# Patient Record
Sex: FEMALE | Race: WHITE | URBAN - METROPOLITAN AREA
[De-identification: names, ages, dates, MRNs, and addresses within clinical notes are randomized per-mention and may not be internally consistent; named-entity substitution may affect disease eponyms.]

---

## 2017-02-09 PROBLEM — 87522002 IRON DEFICIENCY ANEMIA: Status: ACTIVE | Noted: 2017-02-09

## 2017-04-04 PROBLEM — 4556007 GASTRITIS: Status: ACTIVE | Noted: 2017-04-04

## 2017-04-04 PROBLEM — 266433003 GASTRO-ESOPHAGEAL REFLUX DISEASE WITH ESOPHAGITIS: Status: ACTIVE | Noted: 2017-04-04

## 2023-08-22 ENCOUNTER — OFFICE VISIT (OUTPATIENT)
Dept: URBAN - METROPOLITAN AREA CLINIC 35 | Facility: CLINIC | Age: 58
End: 2023-08-22

## 2023-10-02 ENCOUNTER — OFFICE VISIT (OUTPATIENT)
Dept: URBAN - METROPOLITAN AREA CLINIC 33 | Facility: CLINIC | Age: 58
End: 2023-10-02

## 2024-11-13 ENCOUNTER — DASHBOARD ENCOUNTERS (OUTPATIENT)
Age: 59
End: 2024-11-13

## 2024-11-13 ENCOUNTER — OFFICE VISIT (OUTPATIENT)
Dept: URBAN - METROPOLITAN AREA CLINIC 33 | Facility: CLINIC | Age: 59
End: 2024-11-13
Payer: COMMERCIAL

## 2024-11-13 VITALS
DIASTOLIC BLOOD PRESSURE: 76 MMHG | HEIGHT: 64 IN | SYSTOLIC BLOOD PRESSURE: 120 MMHG | WEIGHT: 194 LBS | BODY MASS INDEX: 33.12 KG/M2

## 2024-11-13 DIAGNOSIS — D13.1 BENIGN NEOPLASM OF STOMACH: ICD-10-CM

## 2024-11-13 DIAGNOSIS — R10.9 ABDOMINAL CRAMPING: ICD-10-CM

## 2024-11-13 DIAGNOSIS — K29.60 OTHER GASTRITIS WITHOUT BLEEDING: ICD-10-CM

## 2024-11-13 DIAGNOSIS — K58.0 IRRITABLE BOWEL SYNDROME WITH DIARRHEA: ICD-10-CM

## 2024-11-13 PROBLEM — 197125005: Status: ACTIVE | Noted: 2024-11-13

## 2024-11-13 PROCEDURE — 99203 OFFICE O/P NEW LOW 30 MIN: CPT | Performed by: PHYSICIAN ASSISTANT

## 2024-11-13 RX ORDER — CHOLESTYRAMINE 4 G/9G
1 PACKET MIXED WITH WATER OR NON-CARBONATED DRINK POWDER, FOR SUSPENSION ORAL TWICE A DAY
Status: ACTIVE | COMMUNITY

## 2024-11-13 RX ORDER — FERROUS FUMARATE AND POLYSACCHRIDE IRON VITAMIN MINERAL COMPLEX SUPPLEMENT 191.2; 135.9; 1; 210; 20; 5; 5; 7; 25; 3 MG/1; MG/1; MG/1; MG/1; MG/1; MG/1; MG/1; MG/1; MG/1; UG/1
1 CAPSULE CAPSULE ORAL ONCE A DAY
Qty: 30 | Refills: 2 | Status: ACTIVE | COMMUNITY
Start: 2017-02-09

## 2024-11-13 RX ORDER — ALUMINUM ZIRCONIUM TRICHLOROHYDREX GLY 0.19 G/G
1 TABLET STICK TOPICAL ONCE A DAY
Status: ACTIVE | COMMUNITY

## 2024-11-13 RX ORDER — CALCIUM CARB/VITAMIN D3/VIT K1 500-500-40
TABLET,CHEWABLE ORAL
Status: ACTIVE | COMMUNITY

## 2024-11-13 RX ORDER — CLONAZEPAM 0.5 MG/1
1 TABLET TABLET ORAL TWICE A DAY
Status: ACTIVE | COMMUNITY

## 2024-11-13 RX ORDER — DICYCLOMINE HYDROCHLORIDE 20 MG/1
1 TABLET TABLET ORAL
Qty: 30 | Refills: 1 | OUTPATIENT
Start: 2024-11-13 | End: 2025-01-12

## 2024-11-13 RX ORDER — ASCORBIC ACID 125 MG
1 TABLET TABLET,CHEWABLE ORAL ONCE A DAY
Status: ACTIVE | COMMUNITY

## 2024-11-13 RX ORDER — DICYCLOMINE HCL 20 MG
1 TABLET TABLET ORAL
Status: ACTIVE | COMMUNITY

## 2024-11-13 RX ORDER — FERROUS SULFATE 325(65) MG
1 TABLET TABLET ORAL ONCE A DAY
Status: ACTIVE | COMMUNITY

## 2024-11-13 NOTE — HPI-HOSPITAL FOLLOWUP
Patient presents today for a follow up from the ED. She denies continued episodes of pain. She admits she took a week of Cipro and had some cramping and diarrhea over the course of a few days which then resolved. She admits for the last two days she has been back to having one bowel movement per day with formed stools. She admits she feels like this is a yearly occurance of this happening. She admits a family history of ulcerative colitis wtih aunts and uncles.  She feels these symptoms are repeititve. Pt has history of RA. She has prior dx of IBS. She is prone to abd cramping with diarrhea. She will get diarrhea at least a couple days out of a month. She will have abd cramping when she has these episodes which Bentyl helps.   Last colonoscopy was last year due to digestive issues and was clear per patient. She also had an EGD at the time. She was told she had a lot of gastric polyps.   [facility] HPI: Patient is a 59-year-old female with a past medical history of autoimmune disorder, rheumatoid arthritis, presents with severe, diffuse, lower abdominal cramping that began yesterday morning. Initially, they attributed the discomfort to 'crampy diarrhea,' which they have experienced before. They attempted to manage the pain with Pepto-Bismol and dicyclomine, and abstained from eating or drinking for the day. Despite these measures, the pain persisted and intensified around midnight, accompanied by 'lots and lots of diarrhea that turned to bloody diarrhea.' This morning, the patient experienced episodes of bloody diarrhea and vomiting, prompting them to seek medical attention. They deny fever and report that their last meal prior to the onset of symptoms was chicken wings from a grocery store. They have not taken any antibiotics in the past three months. The patient has a history of gallbladder removal and takes Questran daily. They report that their stool has been bright yellow, similar to bile, since their gallbladder was removed. They also report that they had a colonoscopy about a year ago due to a 'belly full of polyps,' but no concerning findings were reported.  CT Abdomen Pelvis W IV ContrastFinal Result 1.  Diffuse wall thickening, accentuated by incomplete distention. However, mild pericolonic fat stranding seen in the ascending and transverse colon. Overall findings are suggestive of colitis.2.  2.2 cm heterogeneous lesion in the left adnexa could represent a fibroid. Left adnexal mass is not excluded. Recommend correlation with pelvic ultrasound. D  Differential diagnosis: Gastroenteritis, Salmonella, Shigella, E. coli, Crohn's, UC

## 2025-01-29 ENCOUNTER — WEB ENCOUNTER (OUTPATIENT)
Dept: URBAN - METROPOLITAN AREA CLINIC 33 | Facility: CLINIC | Age: 60
End: 2025-01-29

## 2025-02-05 ENCOUNTER — OFFICE VISIT (OUTPATIENT)
Dept: URBAN - METROPOLITAN AREA CLINIC 33 | Facility: CLINIC | Age: 60
End: 2025-02-05